# Patient Record
Sex: MALE | Race: WHITE | URBAN - METROPOLITAN AREA
[De-identification: names, ages, dates, MRNs, and addresses within clinical notes are randomized per-mention and may not be internally consistent; named-entity substitution may affect disease eponyms.]

---

## 2017-09-27 ENCOUNTER — HOSPITAL ENCOUNTER (OUTPATIENT)
Dept: WOUND CARE | Facility: CLINIC | Age: 77
Discharge: HOME | End: 2017-09-27
Attending: PHYSICIAN ASSISTANT | Admitting: PHYSICIAN ASSISTANT
Payer: MEDICARE

## 2017-09-27 VITALS — TEMPERATURE: 98 F | HEART RATE: 79 BPM | SYSTOLIC BLOOD PRESSURE: 143 MMHG | DIASTOLIC BLOOD PRESSURE: 92 MMHG

## 2017-09-27 PROCEDURE — A6209 FOAM DRSG <=16 SQ IN W/O BDR: HCPCS

## 2017-09-27 PROCEDURE — 99203 OFFICE O/P NEW LOW 30 MIN: CPT | Performed by: PHYSICIAN ASSISTANT

## 2017-09-27 PROCEDURE — 27211191 ZZH COFLEX 2 LAYER COMPRESSION WRAP

## 2017-09-27 PROCEDURE — 99214 OFFICE O/P EST MOD 30 MIN: CPT | Mod: 25

## 2017-09-27 PROCEDURE — 97602 WOUND(S) CARE NON-SELECTIVE: CPT

## 2017-09-27 PROCEDURE — A6252 ABSORPT DRG >16 <=48 W/O BDR: HCPCS

## 2017-09-27 RX ORDER — AMMONIUM LACTATE 12 G/100G
CREAM TOPICAL
COMMUNITY
Start: 2016-11-14

## 2017-09-27 RX ORDER — UBIDECARENONE 100 MG
100 CAPSULE ORAL
COMMUNITY
Start: 2013-10-22

## 2017-09-27 RX ORDER — FINASTERIDE 5 MG/1
5 TABLET, FILM COATED ORAL
COMMUNITY

## 2017-09-27 RX ORDER — CHLORAL HYDRATE 500 MG
CAPSULE ORAL
COMMUNITY
Start: 2009-03-31

## 2017-09-27 RX ORDER — ACETAMINOPHEN 160 MG
2000 TABLET,DISINTEGRATING ORAL
COMMUNITY
Start: 2013-10-22

## 2017-09-27 RX ORDER — ALLOPURINOL 300 MG/1
300 TABLET ORAL
COMMUNITY
Start: 2017-08-28

## 2017-09-27 RX ORDER — WARFARIN SODIUM 5 MG/1
TABLET ORAL
COMMUNITY
Start: 2017-03-06

## 2017-09-27 RX ORDER — FUROSEMIDE 40 MG
40 TABLET ORAL
COMMUNITY

## 2017-09-27 RX ORDER — ATENOLOL 100 MG/1
100 TABLET ORAL
COMMUNITY
Start: 2017-03-13

## 2017-09-27 RX ORDER — DIGOXIN 125 MCG
125 TABLET ORAL
COMMUNITY
Start: 2017-04-14

## 2017-09-27 RX ORDER — GEMFIBROZIL 600 MG/1
600 TABLET, FILM COATED ORAL
COMMUNITY
Start: 2017-03-13

## 2017-09-27 NOTE — PROGRESS NOTES
[facility]    HISTORY OF PRESENT ILLNESS: Mr. Joni Kendrick is a 77-year-old gentleman who presents for wounds on his left lower leg. Mr. Kendrick had a DVT in 2005 and has had significant lymphedema in this leg ever since. He was prescribed compression stockings at one point but unfortunately was unable to tolerate them. He reports difficult taking them on and off without injuring his skin. He has been dealing with slow to heal shallow ulcerations on and off in this leg.     WOUND CARE: Currently applying mupirocin ointment daily.    DATE WOUND ACQUIRED: 9/1/17    PAST MEDICAL HISTORY: lymphedema of LLL secondary to DVT 2005, gout, edema, DJD, atrial fibrillation, cardiomyopathy, mitral valve regurgitation, tricuspid regurgitation, CHF, hyperlipidemia    SOCIAL HISTORY: lives in [place]     MEDICATIONS:   Current Outpatient Prescriptions   Medication     allopurinol (ZYLOPRIM) 300 MG tablet     ammonium lactate (AMLACTIN) 12 % cream     atenolol (TENORMIN) 100 MG tablet     Cholecalciferol (VITAMIN D3) 2000 UNITS CAPS     co-enzyme Q-10 (SM COENZYME Q-10) 100 MG CAPS capsule     digoxin (LANOXIN) 125 MCG tablet     fish oil-omega-3 fatty acids 1000 MG capsule     gemfibrozil (LOPID) 600 MG tablet     warfarin (COUMADIN) 5 MG tablet     finasteride (PROSCAR) 5 MG tablet     furosemide (LASIX) 40 MG tablet     No current facility-administered medications for this encounter.        REVIEW OF SYSTEMS:  CONSTITUTIONAL: Denies fevers or acute illness  ENDOCRINE: Denies diabetes    VITALS: BP (!) 143/92  Pulse 79  Temp 98  F (36.7  C) (Tympanic)    PHYSICAL EXAM:  GENERAL: Patient is alert and oriented and in no acute distress  INTEGUMENTARY:   WOUND ASSESSMENT:     Location: left anterior lower leg     Stage/Thickness: Full    Size: 1.2 cm x 1.5 cm with a depth of 0.1 cm    Drainage: moderate amount of serosanguinous drainage    Wound description: shallow, clean, granular  WOUND ASSESSMENT #2:      Location: left lateral lower leg     Stage/Thickness: Full    Size: 6.8 cm x 3.4 cm with a depth of 0.1 cm    Drainage: moderate amount of serosanguinous drainage    Wound description: shallow, clean, granular  WOUND ASSESSMENT #3:     Location: left proximal lateral lower leg      Stage/Thickness: Full    Size: 3.7 cm x 1.9 cm with a depth of 0.1 cm    Drainage: moderate amount of serosanguinous drainage    Wound description: shallow, clean, granular  WOUND ASSESSMENT #3:     Location: left posterior lower leg     Stage/Thickness: Full    Size: 2.0 cm x 3.1 cm with a depth of 0.1 cm    Drainage: moderate amount of serosanguinous drainage    Wound description: shallow, clean, granular    PROCEDURE: Per standing order, topical 4% lidocaine was applied to the wound by the [facility]. The wound was mechanically debrided.     ASSESSMENT: lymphedema ulcers of left leg with fat-layer exposed, lymphedema    PLAN:   1. Primary dressing: ActiCoat 7; Secondary dressing: KerraMax, CoFlex 2-layer wrap  2. Discussed necessity of long term compression therapy. Suggested decongestive therapy with lymphedema therapy but he declined this today. Opted for a few weeks of our wraps and then we will re-evaluate whether he still needs decongestive therapy or if we can send him to get fitted for a different compression garment.     FOLLOW-UP: 3 weeks    JAYCOB CRAFT PA-C

## 2017-10-04 ENCOUNTER — HOSPITAL ENCOUNTER (OUTPATIENT)
Dept: WOUND CARE | Facility: CLINIC | Age: 77
Discharge: HOME | End: 2017-10-04
Attending: PHYSICIAN ASSISTANT | Admitting: PHYSICIAN ASSISTANT
Payer: MEDICARE

## 2017-10-04 PROCEDURE — 99211 OFF/OP EST MAY X REQ PHY/QHP: CPT

## 2022-03-23 ENCOUNTER — MEDICAL CORRESPONDENCE (OUTPATIENT)
Dept: HEALTH INFORMATION MANAGEMENT | Facility: CLINIC | Age: 82
End: 2022-03-23
Payer: COMMERCIAL

## 2022-03-24 ENCOUNTER — TELEPHONE (OUTPATIENT)
Dept: WOUND CARE | Facility: CLINIC | Age: 82
End: 2022-03-24
Payer: COMMERCIAL

## 2022-03-24 NOTE — TELEPHONE ENCOUNTER
Consult received via fax from provider Dr. Carrillo for ulcer of the leg. Patient with hx of smoking  Per Standing order Patient qualifies for AMELIE to be scheduled prior to assessment with Mary Ellen Ozuna or Dr. Natarajan at [facility].    Routing to  Shannan Coon.  Orders pending

## 2022-03-24 NOTE — TELEPHONE ENCOUNTER
Patient declined to schedule. Stated that he has an appointment at [facility] tomorrow.     No more scheduling attempts will be made.      Shannan Coon    [facility]   [phone]